# Patient Record
Sex: MALE | Race: WHITE
[De-identification: names, ages, dates, MRNs, and addresses within clinical notes are randomized per-mention and may not be internally consistent; named-entity substitution may affect disease eponyms.]

---

## 2021-07-27 NOTE — ED
General Adult HPI





- General


Chief complaint: Fall


Stated complaint: fall, hip pain


Time Seen by Provider: 07/27/21 12:48


Source: EMS


Mode of arrival: EMS


Limitations: physical limitation





- History of Present Illness


Initial comments: 


Dictation was produced using dragon dictation software. please excuse any 

grammatical, word or spelling errors. 











Chief Complaint: 51-year-old male presents with left hip pain





History of Present Illness: 51-year-old male he denies any significant 

comorbidities.  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 

instrumentation to replace his left knee.  Patient states that just prior to 

arrival he fell off approximately 2-1/2 feet.  He landed on his left side of 

raising his left elbow.  He states that he had significant left hip pain.  EMS 

provided patient with 50 mg of IV ketamine.  States that helped improve his 

symptoms.  Denies any numbness and paresthesias to the foot





The ROS documented in this emergency department record has been reviewed and 

confirmed by me.  Those systems with pertinent positive or negative responses 

have been documented in the HPI.  All other systems are other negative and/or 

noncontributory.








PHYSICAL EXAM:


General Impression: Alert and oriented x3, not in acute distress


HEENT: Normocephalic atraumatic, extra-ocular movements intact, pupils equal and

reactive to light bilaterally, mucous membranes moist.


Cardiovascular: Heart regular rate and rhythm


Chest: Able to complete full sentences, no retractions, no tachypnea


Abdomen: abdomen soft, non-tender, non-distended, no organomegaly


Musculoskeletal: Pulses present and equal in all extremities, no peripheral 

edema


Left lower; there does appear to be some shortening of the left lower extremity,

tenderness to palpation over the greater trochanter.  Left elbow is ranged with 

no issues.


Motor:  no focal deficits noted


Neurological: CN II-XII grossly intact, no focal motor or sensory deficits noted


Skin: Abrasion over the left elbow


Psych: Normal affect and mood





ED course: 51-year-old male presents with left hip pain after fall.  Vital signs

upon arrival are within acceptable limits.  Patient received 50 mg of IV 

ketamine by prehospital providers.  Patient offered analgesic medications 

however he refused upon initial evaluation.  At approximately 2:00 PM upon 

reevaluation he did request more pain meds.





Laboratory evaluation obtained.  CBC, coag panel, metabolic panel is 

unremarkable.  Hip and pelvis x-ray shows left femoral neck fracture.  This is 

discussed with [facility] who is taking call for orthopedic city call.  The, he back 

and states that patient needs to be transferred to [place] for orthopedic trauma 

service.





Case discussed with Dr. Matthew from orthopedics trauma at [facility] is 

willing to accept the transfer of patient.











- Related Data


                                Home Medications











 Medication  Instructions  Recorded  Confirmed


 


No Known Home Medications  07/27/21 07/27/21











                                    Allergies











Allergy/AdvReac Type Severity Reaction Status Date / Time


 


sertraline [From Zoloft] AdvReac  Nausea Verified 07/27/21 13:48














Review of Systems


ROS Statement: 


Those systems with pertinent positive or pertinent negative responses have been 

documented in the HPI.





ROS Other: All systems not noted in ROS Statement are negative.





Past Medical History


Additional Past Medical History / Comment(s): Osteosarcoma left leg in 1991 chem

o tx


History of Any Multi-Drug Resistant Organisms: None Reported


Past Surgical History: Cholecystectomy, Orthopedic Surgery


Past Psychological History: No Psychological Hx Reported


Smoking Status: Never smoker


Past Alcohol Use History: None Reported


Past Drug Use History: None Reported





General Exam


Limitations: physical limitation





Course


                                   Vital Signs











  07/27/21





  12:45


 


Temperature 98.3 F


 


Pulse Rate 66


 


Respiratory 18





Rate 


 


Blood Pressure 182/106


 


O2 Sat by Pulse 98





Oximetry 














Medical Decision Making





- Lab Data


Result diagrams: 


                                 07/27/21 13:30





                                 07/27/21 13:30


                                   Lab Results











  07/27/21 07/27/21 07/27/21 Range/Units





  13:30 13:30 13:30 


 


WBC  11.4 H    (3.8-10.6)  k/uL


 


RBC  4.56    (4.30-5.90)  m/uL


 


Hgb  14.9    (13.0-17.5)  gm/dL


 


Hct  44.6    (39.0-53.0)  %


 


MCV  97.7    (80.0-100.0)  fL


 


MCH  32.8    (25.0-35.0)  pg


 


MCHC  33.5    (31.0-37.0)  g/dL


 


RDW  12.9    (11.5-15.5)  %


 


Plt Count  238    (150-450)  k/uL


 


MPV  8.2    


 


Neutrophils %  83    %


 


Lymphocytes %  10    %


 


Monocytes %  5    %


 


Eosinophils %  1    %


 


Basophils %  1    %


 


Neutrophils #  9.5 H    (1.3-7.7)  k/uL


 


Lymphocytes #  1.1    (1.0-4.8)  k/uL


 


Monocytes #  0.5    (0-1.0)  k/uL


 


Eosinophils #  0.2    (0-0.7)  k/uL


 


Basophils #  0.1    (0-0.2)  k/uL


 


PT   10.7   (9.0-12.0)  sec


 


INR   1.0   (<1.2)  


 


APTT   24.3   (22.0-30.0)  sec


 


Sodium    141  (137-145)  mmol/L


 


Potassium    4.1  (3.5-5.1)  mmol/L


 


Chloride    107  ()  mmol/L


 


Carbon Dioxide    27  (22-30)  mmol/L


 


Anion Gap    7  mmol/L


 


BUN    12  (9-20)  mg/dL


 


Creatinine    0.98  (0.66-1.25)  mg/dL


 


Est GFR (CKD-EPI)AfAm    >90  (>60 ml/min/1.73 sqM)  


 


Est GFR (CKD-EPI)NonAf    >90  (>60 ml/min/1.73 sqM)  


 


Glucose    99  (74-99)  mg/dL


 


Calcium    9.4  (8.4-10.2)  mg/dL














Disposition


Clinical Impression: 


 Hip fracture





Disposition: OTHER INSTITUTION NOT DEFINED


Referrals: 


None,Stated [Primary Care Provider] - 1-2 days





- Out of Hospital Transfer - Req. Specs


Out of Hospital Transfer - Requested Specifics: Other Emergency [facility] ([facility])

## 2021-07-27 NOTE — XR
EXAMINATION TYPE: XR Hip LT and AP Pelvis

 

DATE OF EXAM: 7/27/2021

 

CLINICAL HISTORY: pain

 

TECHNIQUE: Single view the pelvis is submitted. 3 views of the left hip are also submitted.

 

FINDINGS: There is angulated fracture at the base of the left femoral neck. No additional fractures a
re seen with certainty at this time. Joint spaces are well-preserved.  SI joints appear symmetric.

 

IMPRESSION: 

 

1. Fracture at the base of the left femoral neck.

 

ICD 10 closed FRACTURE, INITIAL EVALUATION

## 2022-05-28 ENCOUNTER — HOSPITAL ENCOUNTER (EMERGENCY)
Dept: HOSPITAL 47 - EC | Age: 52
Discharge: HOME | End: 2022-05-28
Payer: COMMERCIAL

## 2022-05-28 VITALS — SYSTOLIC BLOOD PRESSURE: 145 MMHG | HEART RATE: 78 BPM | TEMPERATURE: 98.5 F | DIASTOLIC BLOOD PRESSURE: 96 MMHG

## 2022-05-28 VITALS — RESPIRATION RATE: 18 BRPM

## 2022-05-28 DIAGNOSIS — R09.1: Primary | ICD-10-CM

## 2022-05-28 DIAGNOSIS — Z20.822: ICD-10-CM

## 2022-05-28 LAB
ALBUMIN SERPL-MCNC: 5.3 G/DL (ref 3.5–5)
ALP SERPL-CCNC: 104 U/L (ref 38–126)
ALT SERPL-CCNC: 33 U/L (ref 4–49)
ANION GAP SERPL CALC-SCNC: 13 MMOL/L
AST SERPL-CCNC: 33 U/L (ref 17–59)
BASOPHILS # BLD AUTO: 0.2 K/UL (ref 0–0.2)
BASOPHILS NFR BLD AUTO: 1 %
BUN SERPL-SCNC: 12 MG/DL (ref 9–20)
CALCIUM SPEC-MCNC: 9.5 MG/DL (ref 8.4–10.2)
CHLORIDE SERPL-SCNC: 104 MMOL/L (ref 98–107)
CO2 SERPL-SCNC: 24 MMOL/L (ref 22–30)
EOSINOPHIL # BLD AUTO: 0.4 K/UL (ref 0–0.7)
EOSINOPHIL NFR BLD AUTO: 3 %
ERYTHROCYTE [DISTWIDTH] IN BLOOD BY AUTOMATED COUNT: 5.16 M/UL (ref 4.3–5.9)
ERYTHROCYTE [DISTWIDTH] IN BLOOD: 12.8 % (ref 11.5–15.5)
GLUCOSE SERPL-MCNC: 91 MG/DL (ref 74–99)
HCT VFR BLD AUTO: 50 % (ref 39–53)
HGB BLD-MCNC: 16.3 GM/DL (ref 13–17.5)
LIPASE SERPL-CCNC: 51 U/L (ref 23–300)
LYMPHOCYTES # SPEC AUTO: 1.4 K/UL (ref 1–4.8)
LYMPHOCYTES NFR SPEC AUTO: 9 %
MCH RBC QN AUTO: 31.6 PG (ref 25–35)
MCHC RBC AUTO-ENTMCNC: 32.6 G/DL (ref 31–37)
MCV RBC AUTO: 96.9 FL (ref 80–100)
MONOCYTES # BLD AUTO: 0.8 K/UL (ref 0–1)
MONOCYTES NFR BLD AUTO: 5 %
NEUTROPHILS # BLD AUTO: 12.2 K/UL (ref 1.3–7.7)
NEUTROPHILS NFR BLD AUTO: 81 %
PLATELET # BLD AUTO: 236 K/UL (ref 150–450)
POTASSIUM SERPL-SCNC: 4.2 MMOL/L (ref 3.5–5.1)
PROT SERPL-MCNC: 8.4 G/DL (ref 6.3–8.2)
SODIUM SERPL-SCNC: 141 MMOL/L (ref 137–145)
WBC # BLD AUTO: 15.1 K/UL (ref 3.8–10.6)

## 2022-05-28 PROCEDURE — 71046 X-RAY EXAM CHEST 2 VIEWS: CPT

## 2022-05-28 PROCEDURE — 84484 ASSAY OF TROPONIN QUANT: CPT

## 2022-05-28 PROCEDURE — 85025 COMPLETE CBC W/AUTO DIFF WBC: CPT

## 2022-05-28 PROCEDURE — 87636 SARSCOV2 & INF A&B AMP PRB: CPT

## 2022-05-28 PROCEDURE — 36415 COLL VENOUS BLD VENIPUNCTURE: CPT

## 2022-05-28 PROCEDURE — 80053 COMPREHEN METABOLIC PANEL: CPT

## 2022-05-28 PROCEDURE — 83690 ASSAY OF LIPASE: CPT

## 2022-05-28 PROCEDURE — 85379 FIBRIN DEGRADATION QUANT: CPT

## 2022-05-28 NOTE — XR
EXAMINATION TYPE: XR chest 2V

 

DATE OF EXAM: 5/28/2022

 

COMPARISON: NONE

 

HISTORY: Chest pain

 

TECHNIQUE: 2 view

 

FINDINGS: Heart is normal. Lungs are clear of consolidation. There are no hilar masses. There are adilia
st leads. Bony thorax is intact.

 

IMPRESSION: No active cardiopulmonary disease. Normal heart.

## 2022-05-28 NOTE — ED
General Adult HPI





- General


Chief complaint: Chest Pain


Stated complaint: chest pain


Time Seen by Provider: 05/28/22 14:19


Source: patient, EMS


Mode of arrival: EMS





- History of Present Illness


Initial comments: 


Dictation was produced using dragon dictation software. please excuse any 

grammatical, word or spelling errors. 











Chief Complaint: 52-year-old male presents emergency department for chest pain





History of Present Illness: Patient is a 52-year-old male is brought in by EMS 

for chest pain.  He states that earlier today he was cutting the grass when he 

began to feel sharp chest pain in his back and substernal chest.  States that he

feels like his symptoms are reproducible with deep inspiration.  He called EMS 

and was brought to the emergency department.  Just prior to arrival he began 

feeling constitutional symptoms.  She denies any obvious sick exposures.  Denies

any cough.  Denies shortness of breath.  His history of osteosarcoma that was 

treated with chemotherapy.  He's been in remission since 1991.  Denies any lower

external swelling or pain.  No history of blood clots or PEs.








The ROS documented in this emergency department record has been reviewed and 

confirmed by me.  Those systems with pertinent positive or negative responses 

have been documented in the HPI.  All other systems are other negative and/or 

noncontributory.








PHYSICAL EXAM:


General Impression: Alert and oriented x3, not in acute distress


HEENT: Normocephalic atraumatic, extra-ocular movements intact, pupils equal and

reactive to light bilaterally, mucous membranes moist.


Cardiovascular: Heart regular rate and rhythm


Chest: Able to complete full sentences, no retractions, no tachypnea


Abdomen: abdomen soft, non-tender, non-distended, no organomegaly


Musculoskeletal: Pulses present and equal in all extremities, no peripheral 

edema


Motor:  no focal deficits noted


Neurological: CN II-XII grossly intact, no focal motor or sensory deficits noted


Skin: Intact with no visualized rashes


Psych: Normal affect and mood





ED course: 51 yo well-appearing male presents to the emergency department for 

chief complaint of chest pain.  His pain is atypical and pleuritic.  As upon 

arrival shows temperature 100.5, rest of vital signs within acceptable limits.  

He is a component of acute infectious process with pyrexia and constitutional 

symptoms.  EKG does not show signs of ischemia or infarction.





Lavatory evaluation obtained.  Mild leukocytosis of 15.1.  D-dimer is negative 

at 0.5.  Metabolic panel is negative.  Cardiac enzymes negative.  4 panel viral 

PCR is negative.  Chest x-ray is nonacute.  Patient observed in the emergency 

department for approximately 2 hours and 30 minutes.  He is reevaluated at 

bedside found to be stable medical condition.  Patient's clinical symptoms 

likely secondary to early viral infection.  Patient given prescription for 

antibiotics to be taken in a week and see fashion.  Patient told that in 3 days 

if his symptoms are not improved starts to get worse with the development of 

respiratory symptoms he is started to go ahead and start taking the medications.

 Otherwise is advised follow-up with his primary care doctor.





EKG interpretation: Ventricular rate 90, sinus rhythm,.  160, QS 90, . No

ME prolongation, no QTC prolongation, no ST or T-wave changes noted.  No old EKG

for comparison Overall, this EKG is unremarkable











- Related Data


                                  Previous Rx's











 Medication  Instructions  Recorded


 


Azithromycin [Zithromax Z-pack] 0 mg PO AS DIRECTED #6 tab 05/28/22











                                    Allergies











Allergy/AdvReac Type Severity Reaction Status Date / Time


 


sertraline [From Zoloft] AdvReac  Nausea Verified 07/27/21 13:48














Review of Systems


ROS Statement: 


Those systems with pertinent positive or pertinent negative responses have been 

documented in the HPI.





ROS Other: All systems not noted in ROS Statement are negative.





Past Medical History


Additional Past Medical History / Comment(s): Osteosarcoma left leg in 1991 

chemo tx


History of Any Multi-Drug Resistant Organisms: None Reported


Past Surgical History: Cholecystectomy, Orthopedic Surgery


Past Psychological History: No Psychological Hx Reported


Smoking Status: Never smoker


Past Alcohol Use History: None Reported


Past Drug Use History: None Reported





Course


                                   Vital Signs











  05/28/22





  14:40


 


Temperature 100.5 F H


 


Pulse Rate 83


 


Respiratory 18





Rate 


 


Blood Pressure 147/105


 


O2 Sat by Pulse 96





Oximetry 














Medical Decision Making





- Lab Data


Result diagrams: 


                                 05/28/22 14:53





                                 05/28/22 14:53


                                   Lab Results











  05/28/22 05/28/22 05/28/22 Range/Units





  14:53 14:53 14:53 


 


WBC  15.1 H    (3.8-10.6)  k/uL


 


RBC  5.16    (4.30-5.90)  m/uL


 


Hgb  16.3    (13.0-17.5)  gm/dL


 


Hct  50.0    (39.0-53.0)  %


 


MCV  96.9    (80.0-100.0)  fL


 


MCH  31.6    (25.0-35.0)  pg


 


MCHC  32.6    (31.0-37.0)  g/dL


 


RDW  12.8    (11.5-15.5)  %


 


Plt Count  236    (150-450)  k/uL


 


MPV  7.6    


 


Neutrophils %  81    %


 


Lymphocytes %  9    %


 


Monocytes %  5    %


 


Eosinophils %  3    %


 


Basophils %  1    %


 


Neutrophils #  12.2 H    (1.3-7.7)  k/uL


 


Lymphocytes #  1.4    (1.0-4.8)  k/uL


 


Monocytes #  0.8    (0-1.0)  k/uL


 


Eosinophils #  0.4    (0-0.7)  k/uL


 


Basophils #  0.2    (0-0.2)  k/uL


 


D-Dimer   0.50   (<0.60)  mg/L FEU


 


Sodium    141  (137-145)  mmol/L


 


Potassium    4.2  (3.5-5.1)  mmol/L


 


Chloride    104  ()  mmol/L


 


Carbon Dioxide    24  (22-30)  mmol/L


 


Anion Gap    13  mmol/L


 


BUN    12  (9-20)  mg/dL


 


Creatinine    1.01  (0.66-1.25)  mg/dL


 


Est GFR (CKD-EPI)AfAm    >90  (>60 ml/min/1.73 sqM)  


 


Est GFR (CKD-EPI)NonAf    87  (>60 ml/min/1.73 sqM)  


 


Glucose    91  (74-99)  mg/dL


 


Calcium    9.5  (8.4-10.2)  mg/dL


 


Total Bilirubin    1.0  (0.2-1.3)  mg/dL


 


AST    33  (17-59)  U/L


 


ALT    33  (4-49)  U/L


 


Alkaline Phosphatase    104  ()  U/L


 


Troponin I     (0.000-0.034)  ng/mL


 


Total Protein    8.4 H  (6.3-8.2)  g/dL


 


Albumin    5.3 H  (3.5-5.0)  g/dL


 


Lipase    51  ()  U/L


 


Influenza Type A (PCR)     (Not Detectd)  


 


Influenza Type B (PCR)     (Not Detectd)  


 


RSV (PCR)     (Not Detectd)  


 


SARS-CoV-2 (PCR)     (Not Detectd)  














  05/28/22 05/28/22 Range/Units





  14:53 14:53 


 


WBC    (3.8-10.6)  k/uL


 


RBC    (4.30-5.90)  m/uL


 


Hgb    (13.0-17.5)  gm/dL


 


Hct    (39.0-53.0)  %


 


MCV    (80.0-100.0)  fL


 


MCH    (25.0-35.0)  pg


 


MCHC    (31.0-37.0)  g/dL


 


RDW    (11.5-15.5)  %


 


Plt Count    (150-450)  k/uL


 


MPV    


 


Neutrophils %    %


 


Lymphocytes %    %


 


Monocytes %    %


 


Eosinophils %    %


 


Basophils %    %


 


Neutrophils #    (1.3-7.7)  k/uL


 


Lymphocytes #    (1.0-4.8)  k/uL


 


Monocytes #    (0-1.0)  k/uL


 


Eosinophils #    (0-0.7)  k/uL


 


Basophils #    (0-0.2)  k/uL


 


D-Dimer    (<0.60)  mg/L FEU


 


Sodium    (137-145)  mmol/L


 


Potassium    (3.5-5.1)  mmol/L


 


Chloride    ()  mmol/L


 


Carbon Dioxide    (22-30)  mmol/L


 


Anion Gap    mmol/L


 


BUN    (9-20)  mg/dL


 


Creatinine    (0.66-1.25)  mg/dL


 


Est GFR (CKD-EPI)AfAm    (>60 ml/min/1.73 sqM)  


 


Est GFR (CKD-EPI)NonAf    (>60 ml/min/1.73 sqM)  


 


Glucose    (74-99)  mg/dL


 


Calcium    (8.4-10.2)  mg/dL


 


Total Bilirubin    (0.2-1.3)  mg/dL


 


AST    (17-59)  U/L


 


ALT    (4-49)  U/L


 


Alkaline Phosphatase    ()  U/L


 


Troponin I   <0.012  (0.000-0.034)  ng/mL


 


Total Protein    (6.3-8.2)  g/dL


 


Albumin    (3.5-5.0)  g/dL


 


Lipase    ()  U/L


 


Influenza Type A (PCR)  Not Detected   (Not Detectd)  


 


Influenza Type B (PCR)  Not Detected   (Not Detectd)  


 


RSV (PCR)  Not Detected   (Not Detectd)  


 


SARS-CoV-2 (PCR)  Not Detected   (Not Detectd)  














Disposition


Clinical Impression: 


 Pleurisy





Disposition: HOME SELF-CARE


Condition: Good


Instructions (If sedation given, give patient instructions):  Pleurisy (ED)


Prescriptions: 


Azithromycin [Zithromax Z-pack] 0 mg PO AS DIRECTED #6 tab


Is patient prescribed a controlled substance at d/c from ED?: No


Referrals: 


None,Stated [Primary Care Provider] - 1-2 days